# Patient Record
Sex: MALE | Race: BLACK OR AFRICAN AMERICAN | NOT HISPANIC OR LATINO | URBAN - METROPOLITAN AREA
[De-identification: names, ages, dates, MRNs, and addresses within clinical notes are randomized per-mention and may not be internally consistent; named-entity substitution may affect disease eponyms.]

---

## 2018-08-01 ENCOUNTER — EMERGENCY (EMERGENCY)
Facility: HOSPITAL | Age: 23
LOS: 1 days | Discharge: HOME | End: 2018-08-01
Attending: EMERGENCY MEDICINE | Admitting: EMERGENCY MEDICINE
Payer: COMMERCIAL

## 2018-08-01 VITALS
OXYGEN SATURATION: 98 % | SYSTOLIC BLOOD PRESSURE: 133 MMHG | WEIGHT: 173.28 LBS | DIASTOLIC BLOOD PRESSURE: 70 MMHG | RESPIRATION RATE: 18 BRPM | HEART RATE: 57 BPM | TEMPERATURE: 98 F

## 2018-08-01 DIAGNOSIS — Z87.891 PERSONAL HISTORY OF NICOTINE DEPENDENCE: ICD-10-CM

## 2018-08-01 DIAGNOSIS — R07.89 OTHER CHEST PAIN: ICD-10-CM

## 2018-08-01 DIAGNOSIS — R06.02 SHORTNESS OF BREATH: ICD-10-CM

## 2018-08-01 PROCEDURE — 99283 EMERGENCY DEPT VISIT LOW MDM: CPT

## 2018-08-01 NOTE — ED ADULT NURSE NOTE - OBJECTIVE STATEMENT
Patient c/o of 2/10 chest pain , states started 30min PTA to ED, w/ some mild SOB, no cough, no fevers.  Patient refusing EKG and to get into gown.  Patient states " how long will this take because someone is picking me up and where is the doctor because I just want a note to give to work to say I was here in the ED to be checked out."  Dr. Ortiz made aware.

## 2018-08-01 NOTE — ED ADULT TRIAGE NOTE - CHIEF COMPLAINT QUOTE
pt c/o chest pain and feeling SOB since 30 min PTA. pt able to speak clear, on the phone while being interview. refused EKG.  pt was explained the need for test, but still refusing ekg.

## 2018-08-01 NOTE — ED PROVIDER NOTE - MEDICAL DECISION MAKING DETAILS
transient chest pain after running.  unlikely acs, pe, pneumothorax.  discussed desire to do ecg but patient refusing.  states if symptoms recur/more persistent will return.  just wants note for work

## 2018-08-01 NOTE — ED PROVIDER NOTE - OBJECTIVE STATEMENT
here with episode of chest pain and shortness of breath.  Lasted about 10 seconds when running across the street.  Has felt it before and attributed to smoking cigarettes which he has since stopped.  Denies fever/chills, cough.  Now asymptomatic.  Says his boss brought him here and told him he needs to be seen but he doesn't want any tests done

## 2018-08-01 NOTE — ED ADULT NURSE NOTE - CHPI ED NUR SYMPTOMS NEG
no nausea/no weakness/no chills/no vomiting/no dizziness/no fever/no pain/no decreased eating/drinking/no tingling

## 2018-10-27 NOTE — ED ADULT NURSE NOTE - NSIMPLEMENTINTERV_GEN_ALL_ED
Supplemental Oxygen (see orders) Implemented All Universal Safety Interventions:  [place] to call system. Call bell, personal items and telephone within reach. Instruct patient to call for assistance. Room bathroom lighting operational. Non-slip footwear when patient is off stretcher. Physically safe environment: no spills, clutter or unnecessary equipment. Stretcher in lowest position, wheels locked, appropriate side rails in place.

## 2018-11-06 ENCOUNTER — INPATIENT (INPATIENT)
Facility: HOSPITAL | Age: 23
LOS: 2 days | Discharge: HOME | DRG: 897 | End: 2018-11-09
Attending: STUDENT IN AN ORGANIZED HEALTH CARE EDUCATION/TRAINING PROGRAM | Admitting: STUDENT IN AN ORGANIZED HEALTH CARE EDUCATION/TRAINING PROGRAM
Payer: COMMERCIAL

## 2018-11-06 VITALS
HEART RATE: 77 BPM | TEMPERATURE: 98 F | OXYGEN SATURATION: 100 % | SYSTOLIC BLOOD PRESSURE: 141 MMHG | WEIGHT: 169.98 LBS | DIASTOLIC BLOOD PRESSURE: 90 MMHG | RESPIRATION RATE: 18 BRPM
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
## 2018-11-06 DIAGNOSIS — F12.259 CANNABIS DEPENDENCE WITH PSYCHOTIC DISORDER, UNSPECIFIED: ICD-10-CM

## 2018-11-06 LAB
ANION GAP SERPL CALC-SCNC: 19 MMOL/L — HIGH (ref 5–17)
APAP SERPL-MCNC: <5 UG/ML — LOW (ref 10–30)
APPEARANCE UR: CLEAR — SIGNIFICANT CHANGE UP
BASOPHILS NFR BLD AUTO: 0.4 % — SIGNIFICANT CHANGE UP (ref 0–2)
BILIRUB UR-MCNC: ABNORMAL
BUN SERPL-MCNC: 22 MG/DL — SIGNIFICANT CHANGE UP (ref 7–23)
CALCIUM SERPL-MCNC: 9.9 MG/DL — SIGNIFICANT CHANGE UP (ref 8.4–10.5)
CHLORIDE SERPL-SCNC: 99 MMOL/L — SIGNIFICANT CHANGE UP (ref 96–108)
CO2 SERPL-SCNC: 22 MMOL/L — SIGNIFICANT CHANGE UP (ref 22–31)
COLOR SPEC: YELLOW — SIGNIFICANT CHANGE UP
CREAT SERPL-MCNC: 1.1 MG/DL — SIGNIFICANT CHANGE UP (ref 0.5–1.3)
DIFF PNL FLD: NEGATIVE — SIGNIFICANT CHANGE UP
EOSINOPHIL NFR BLD AUTO: 0.5 % — SIGNIFICANT CHANGE UP (ref 0–6)
ETHANOL SERPL-MCNC: <10 MG/DL — SIGNIFICANT CHANGE UP (ref 0–10)
GLUCOSE SERPL-MCNC: 93 MG/DL — SIGNIFICANT CHANGE UP (ref 70–99)
GLUCOSE UR QL: NEGATIVE — SIGNIFICANT CHANGE UP
HCT VFR BLD CALC: 40.8 % — SIGNIFICANT CHANGE UP (ref 39–50)
HGB BLD-MCNC: 14.2 G/DL — SIGNIFICANT CHANGE UP (ref 13–17)
KETONES UR-MCNC: >=80 MG/DL
LEUKOCYTE ESTERASE UR-ACNC: NEGATIVE — SIGNIFICANT CHANGE UP
LYMPHOCYTES # BLD AUTO: 21.1 % — SIGNIFICANT CHANGE UP (ref 13–44)
MCHC RBC-ENTMCNC: 29.6 PG — SIGNIFICANT CHANGE UP (ref 27–34)
MCHC RBC-ENTMCNC: 34.8 G/DL — SIGNIFICANT CHANGE UP (ref 32–36)
MCV RBC AUTO: 85.2 FL — SIGNIFICANT CHANGE UP (ref 80–100)
MONOCYTES NFR BLD AUTO: 10.9 % — SIGNIFICANT CHANGE UP (ref 2–14)
NEUTROPHILS NFR BLD AUTO: 67.1 % — SIGNIFICANT CHANGE UP (ref 43–77)
NITRITE UR-MCNC: NEGATIVE — SIGNIFICANT CHANGE UP
PCP SPEC-MCNC: SIGNIFICANT CHANGE UP
PH UR: 6 — SIGNIFICANT CHANGE UP (ref 5–8)
PLATELET # BLD AUTO: 174 K/UL — SIGNIFICANT CHANGE UP (ref 150–400)
POTASSIUM SERPL-MCNC: 3.7 MMOL/L — SIGNIFICANT CHANGE UP (ref 3.5–5.3)
POTASSIUM SERPL-SCNC: 3.7 MMOL/L — SIGNIFICANT CHANGE UP (ref 3.5–5.3)
PROT UR-MCNC: 30 MG/DL
RBC # BLD: 4.79 M/UL — SIGNIFICANT CHANGE UP (ref 4.2–5.8)
RBC # FLD: 12.8 % — SIGNIFICANT CHANGE UP (ref 10.3–16.9)
SALICYLATES SERPL-MCNC: <0.3 MG/DL — LOW (ref 2.8–20)
SODIUM SERPL-SCNC: 140 MMOL/L — SIGNIFICANT CHANGE UP (ref 135–145)
SP GR SPEC: >=1.03 — SIGNIFICANT CHANGE UP (ref 1–1.03)
UROBILINOGEN FLD QL: 1 E.U./DL — SIGNIFICANT CHANGE UP
WBC # BLD: 9.6 K/UL — SIGNIFICANT CHANGE UP (ref 3.8–10.5)
WBC # FLD AUTO: 9.6 K/UL — SIGNIFICANT CHANGE UP (ref 3.8–10.5)

## 2018-11-06 PROCEDURE — 90792 PSYCH DIAG EVAL W/MED SRVCS: CPT

## 2018-11-06 PROCEDURE — 99285 EMERGENCY DEPT VISIT HI MDM: CPT | Mod: 25

## 2018-11-06 PROCEDURE — 93010 ELECTROCARDIOGRAM REPORT: CPT

## 2018-11-06 RX ORDER — RISPERIDONE 4 MG/1
1 TABLET ORAL EVERY 12 HOURS
Qty: 0 | Refills: 0 | Status: DISCONTINUED | OUTPATIENT
Start: 2018-11-06 | End: 2018-11-08

## 2018-11-06 RX ORDER — DIPHENHYDRAMINE HCL 50 MG
50 CAPSULE ORAL ONCE
Qty: 0 | Refills: 0 | Status: COMPLETED | OUTPATIENT
Start: 2018-11-06 | End: 2018-11-06

## 2018-11-06 RX ADMIN — Medication 50 MILLIGRAM(S): at 22:05

## 2018-11-06 NOTE — ED BEHAVIORAL HEALTH ASSESSMENT NOTE - DESCRIPTION
denies Patient was born in [place]. He has a brother and a sister. He is single. He used to work as a  patient presents calm and cooperative

## 2018-11-06 NOTE — ED PROVIDER NOTE - MEDICAL DECISION MAKING DETAILS
Patient in ED with concern for auditory hallucinations.  Patient without SI/HI.  Non toxic, labs and urine showing sign of dehydration.  Patient tolerating oral hydration without difficulty.  Psych in ED to eval patient and recs are made for admission at this time.  Psych aware of dehydration, however no need to medically admit for IV hydration as patient tolerating oral without difficulty.  Patient medically cleared for psychiatric admission.
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
## 2018-11-06 NOTE — ED PROVIDER NOTE - OBJECTIVE STATEMENT
23 year old male with history of psychiatric disease presents to ED with concern for auditory hallucinations over the past several months.  Patient states he was on risperdol but had been taken off of this medication "a while ago."  Patient states the dosage was too high and it was making him "zombie-noelle."  He notes multiple prior psychiatric hospitalizations and feels he may need hospitalization to "get back on track."  He denies associated fever, chills, chest pain, shortness of breath, abdominal pain, nausea, vomiting or any additional acute complaints or concerns at this time.

## 2018-11-06 NOTE — ED ADULT NURSE NOTE - OBJECTIVE STATEMENT
Patient presents to the ED "I hear people talking to me, but when I look there is no one there."  Patient states he is diagnosed bipolar but not taking medications.  denies SERGIO BROWNE.  1:1 at bedside. Patient presents to the ED "I hear people talking to me, but when I look there is no one there."  Patient states he is diagnosed bipolar but not taking medications.  denies SI, HI.   Patient cooperative at this time, speaking fast and loudly.   1:1 at bedside.

## 2018-11-06 NOTE — ED BEHAVIORAL HEALTH ASSESSMENT NOTE - SUMMARY
22 y/o man AA, unemployed, homeless, single with no PMH and self reported history of bipolar disorder, K2 use presenting to the ED reporting worsening auditory hallucinations for the last 2 days in context of ongoing use of K2 and nonadherence with his treatment with risperidone 2mg.  Patient is currently requesting a voluntary admission,  to be restarted on Risperidone and to be reconnected with outpatient treatment.

## 2018-11-06 NOTE — ED BEHAVIORAL HEALTH ASSESSMENT NOTE - OTHER PAST PSYCHIATRIC HISTORY (INCLUDE DETAILS REGARDING ONSET, COURSE OF ILLNESS, INPATIENT/OUTPATIENT TREATMENT)
Patient reports multiple past psychiatric admission. His last admission was 2-3 years ago at [facility]

## 2018-11-06 NOTE — ED ADULT NURSE NOTE - NSIMPLEMENTINTERV_GEN_ALL_ED
Implemented All Universal Safety Interventions:  [place] to call system. Call bell, personal items and telephone within reach. Instruct patient to call for assistance. Room bathroom lighting operational. Non-slip footwear when patient is off stretcher. Physically safe environment: no spills, clutter or unnecessary equipment. Stretcher in lowest position, wheels locked, appropriate side rails in place.

## 2018-11-06 NOTE — ED BEHAVIORAL HEALTH ASSESSMENT NOTE - RISK ASSESSMENT
Patient presents calm and cooperative. He denies any SI/HI. Due to his recent use of K2 he is at risk of having impulsive behavior.

## 2018-11-06 NOTE — ED ADULT TRIAGE NOTE - CHIEF COMPLAINT QUOTE
Patient states he is hearing voices and waking up in places he doesn't know.  Patient states he was on Risperdal, but recently stopped because he felt it wasn't necessary. Patient admits to K2 use last night.  Patient denies any VH, SI/HI or any other complaints.  Patient cooperative, seems anxious and paranoid.

## 2018-11-07 DIAGNOSIS — F31.9 BIPOLAR DISORDER, UNSPECIFIED: ICD-10-CM

## 2018-11-07 DIAGNOSIS — F31.63 BIPOLAR DISORDER, CURRENT EPISODE MIXED, SEVERE, WITHOUT PSYCHOTIC FEATURES: ICD-10-CM

## 2018-11-07 DIAGNOSIS — F19.90 OTHER PSYCHOACTIVE SUBSTANCE USE, UNSPECIFIED, UNCOMPLICATED: ICD-10-CM

## 2018-11-07 PROCEDURE — 99232 SBSQ HOSP IP/OBS MODERATE 35: CPT

## 2018-11-07 RX ORDER — DIPHENHYDRAMINE HCL 50 MG
50 CAPSULE ORAL ONCE
Qty: 0 | Refills: 0 | Status: COMPLETED | OUTPATIENT
Start: 2018-11-07 | End: 2018-11-08

## 2018-11-07 NOTE — BEHAVIORAL HEALTH ASSESSMENT NOTE - NS ED BHA MSE SPEECH SPONTANEITY
Note Text (......Xxx Chief Complaint.): This diagnosis correlates with the
Other (Free Text): Soaks handout given to pt
Detail Level: Simple
Normal

## 2018-11-07 NOTE — BEHAVIORAL HEALTH ASSESSMENT NOTE - NSBHCHARTREVIEWVS_PSY_A_CORE FT
Vital Signs Last 24 Hrs  T(C): 36.8 (07 Nov 2018 09:30), Max: 37.2 (06 Nov 2018 18:48)  T(F): 98.3 (07 Nov 2018 09:30), Max: 99 (06 Nov 2018 18:48)  HR: 81 (07 Nov 2018 09:30) (61 - 84)  BP: 152/86 (07 Nov 2018 09:30) (132/79 - 152/86)  BP(mean): --  RR: 18 (07 Nov 2018 09:30) (18 - 18)  SpO2: 100% (06 Nov 2018 17:04) (100% - 100%)

## 2018-11-07 NOTE — BEHAVIORAL HEALTH ASSESSMENT NOTE - RISK ASSESSMENT
static: mood disorder ; substance abuse with potential for psychosis (K-2)  modifiable: stabilize mood; "wash out period" for K-2; treat psychotic features; addressed in treatment plan  protective: appears to want to live; does not wish to die.
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
## 2018-11-08 PROCEDURE — 99222 1ST HOSP IP/OBS MODERATE 55: CPT | Mod: GC

## 2018-11-08 PROCEDURE — 99232 SBSQ HOSP IP/OBS MODERATE 35: CPT

## 2018-11-08 RX ORDER — DIPHENHYDRAMINE HCL 50 MG
50 CAPSULE ORAL ONCE
Qty: 0 | Refills: 0 | Status: DISCONTINUED | OUTPATIENT
Start: 2018-11-08 | End: 2018-11-09

## 2018-11-08 RX ORDER — HALOPERIDOL DECANOATE 100 MG/ML
5 INJECTION INTRAMUSCULAR ONCE
Qty: 0 | Refills: 0 | Status: DISCONTINUED | OUTPATIENT
Start: 2018-11-08 | End: 2018-11-09

## 2018-11-08 RX ORDER — RISPERIDONE 4 MG/1
1.5 TABLET ORAL EVERY 12 HOURS
Qty: 0 | Refills: 0 | Status: DISCONTINUED | OUTPATIENT
Start: 2018-11-08 | End: 2018-11-09

## 2018-11-08 RX ORDER — TETANUS AND DIPHTHERIA TOXOIDS ADSORBED 2; 2 [LF]/.5ML; [LF]/.5ML
0.5 INJECTION INTRAMUSCULAR ONCE
Qty: 0 | Refills: 0 | Status: COMPLETED | OUTPATIENT
Start: 2018-11-08 | End: 2018-11-08

## 2018-11-08 RX ORDER — DIPHENHYDRAMINE HCL 50 MG
50 CAPSULE ORAL ONCE
Qty: 0 | Refills: 0 | Status: COMPLETED | OUTPATIENT
Start: 2018-11-08 | End: 2018-11-08

## 2018-11-08 RX ORDER — DIPHENHYDRAMINE HCL 50 MG
50 CAPSULE ORAL
Qty: 0 | Refills: 0 | Status: DISCONTINUED | OUTPATIENT
Start: 2018-11-08 | End: 2018-11-08

## 2018-11-08 RX ORDER — BACITRACIN ZINC 500 UNIT/G
1 OINTMENT IN PACKET (EA) TOPICAL EVERY 6 HOURS
Qty: 0 | Refills: 0 | Status: DISCONTINUED | OUTPATIENT
Start: 2018-11-08 | End: 2018-11-09

## 2018-11-08 RX ORDER — DIPHENHYDRAMINE HCL 50 MG
50 CAPSULE ORAL
Qty: 0 | Refills: 0 | Status: COMPLETED | OUTPATIENT
Start: 2018-11-08 | End: 2018-11-09

## 2018-11-08 RX ORDER — HALOPERIDOL DECANOATE 100 MG/ML
5 INJECTION INTRAMUSCULAR ONCE
Qty: 0 | Refills: 0 | Status: COMPLETED | OUTPATIENT
Start: 2018-11-08 | End: 2018-11-08

## 2018-11-08 RX ADMIN — Medication 50 MILLIGRAM(S): at 23:40

## 2018-11-08 RX ADMIN — HALOPERIDOL DECANOATE 5 MILLIGRAM(S): 100 INJECTION INTRAMUSCULAR at 05:24

## 2018-11-08 RX ADMIN — Medication 1 APPLICATION(S): at 22:03

## 2018-11-08 RX ADMIN — Medication 50 MILLIGRAM(S): at 05:24

## 2018-11-08 RX ADMIN — Medication 2 MILLIGRAM(S): at 05:24

## 2018-11-08 RX ADMIN — TETANUS AND DIPHTHERIA TOXOIDS ADSORBED 0.5 MILLILITER(S): 2; 2 INJECTION INTRAMUSCULAR at 16:51

## 2018-11-08 RX ADMIN — Medication 50 MILLIGRAM(S): at 00:13

## 2018-11-08 RX ADMIN — RISPERIDONE 1 MILLIGRAM(S): 4 TABLET ORAL at 19:10

## 2018-11-08 RX ADMIN — Medication 1 APPLICATION(S): at 19:10
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
## 2018-11-08 NOTE — PROGRESS NOTE BEHAVIORAL HEALTH - NSBHFUPINTERVALCCFT_PSY_A_CORE
Patient had little to say from the quiet room; sleep appetite ok.  While mentioned pain in legs did not want to show legs to writer (and RN present during the interview) nor allow Xrays at this time.  No explanation of aggressive and destructive behavior of last night.  Mostly stays under the sheet ; avoidant.

## 2018-11-08 NOTE — PROGRESS NOTE BEHAVIORAL HEALTH - NSBHCHARTREVIEWVS_PSY_A_CORE FT
Vital Signs Last 24 Hrs  T(C): 36.8 (07 Nov 2018 17:00), Max: 36.8 (07 Nov 2018 17:00)  T(F): 98.3 (07 Nov 2018 17:00), Max: 98.3 (07 Nov 2018 17:00)  HR: 88 (07 Nov 2018 17:00) (88 - 88)  BP: 157/89 (07 Nov 2018 17:00) (157/89 - 157/89)  BP(mean): --  RR: 18 (07 Nov 2018 17:00) (18 - 18)  SpO2: --

## 2018-11-09 VITALS
HEART RATE: 81 BPM | RESPIRATION RATE: 20 BRPM | SYSTOLIC BLOOD PRESSURE: 145 MMHG | DIASTOLIC BLOOD PRESSURE: 81 MMHG | TEMPERATURE: 98 F

## 2018-11-09 PROCEDURE — 99238 HOSP IP/OBS DSCHRG MGMT 30/<: CPT

## 2018-11-09 RX ORDER — RISPERIDONE 4 MG/1
1 TABLET ORAL
Qty: 30 | Refills: 0 | OUTPATIENT
Start: 2018-11-09 | End: 2018-11-23

## 2018-11-09 RX ORDER — BACITRACIN ZINC 500 UNIT/G
1 OINTMENT IN PACKET (EA) TOPICAL
Qty: 1 | Refills: 0 | OUTPATIENT
Start: 2018-11-09 | End: 2018-11-15

## 2018-11-09 RX ADMIN — Medication 1 APPLICATION(S): at 07:38

## 2018-11-09 RX ADMIN — Medication 50 MILLIGRAM(S): at 01:30
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
## 2018-11-09 NOTE — PROGRESS NOTE BEHAVIORAL HEALTH - PROBLEM SELECTOR PLAN 2
Risperdal ; see interval and summary data for updates;  counselling;
Risperdal ; see interval and summary data for updates;  counselling;

## 2018-11-09 NOTE — PROGRESS NOTE BEHAVIORAL HEALTH - NSBHCHARTREVIEWVS_PSY_A_CORE FT
Vital Signs Last 24 Hrs  T(C): 36.7 (09 Nov 2018 08:48), Max: 36.9 (08 Nov 2018 20:39)  T(F): 98.1 (09 Nov 2018 08:48), Max: 98.5 (08 Nov 2018 20:39)  HR: 81 (09 Nov 2018 08:48) (73 - 81)  BP: 145/81 (09 Nov 2018 08:48) (145/81 - 151/82)  BP(mean): --  RR: 20 (09 Nov 2018 08:48) (18 - 20)  SpO2: --

## 2018-11-09 NOTE — PROGRESS NOTE BEHAVIORAL HEALTH - PROBLEM SELECTOR PLAN 1
Risperdal ; see interval and summary data for updates
Risperdal ; see interval and summary data for updates
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
## 2018-11-09 NOTE — PROGRESS NOTE BEHAVIORAL HEALTH - RISK ASSESSMENT
static: mood disorder ; substance abuse with potential for psychosis (K-2)  modifiable: stabilize mood; "wash out period" for K-2; treat psychotic features; addressed in treatment plan  protective: appears to want to live; does not wish to die.
static: mood disorder ; substance abuse with potential for psychosis (K-2)  modifiable: stabilize mood; "wash out period" for K-2; treat psychotic features; addressed in treatment plan  protective: appears to want to live; does not wish to die.

## 2018-11-09 NOTE — PROGRESS NOTE BEHAVIORAL HEALTH - NSBHFUPINTERVALHXFT_PSY_A_CORE
MSE: dressed and sitting at family meeting  . .  good eye contact.  clean; alert, oriented, cognition intact.  denies AVH or s/h i/i/p.  speech normal volume, spontaneous, clear;  demonstrates normal gait;  no tremor or rigidity; affect generally constricted.  no strangeness of language use;  focused on leaving ; mood slightly sad but not constricted;  thought congruent.    i&j: fair to poor; names some medications.  accepts verbal therapy.  appears to accept treatments and need to treat sxs. MSE:  attends some  groups; fair eye contact; clean.    at the nursing station most of the time.   cognition is intact.  would not cooperate with interview; speech normal volume, spontaneous, clear; no evidence of tremor or rigidity; smiles inappropriately; mildly anxious mood and affect; tc/tp no peculiarities of language use.  rather evasive and somewhat guarded.  i&j: poor; accepts treatment; however not reflective on sxs or situation; accepts verbal therapy.

## 2018-11-14 DIAGNOSIS — S61.411A LACERATION WITHOUT FOREIGN BODY OF RIGHT HAND, INITIAL ENCOUNTER: ICD-10-CM

## 2018-11-14 DIAGNOSIS — S61.512A LACERATION WITHOUT FOREIGN BODY OF LEFT WRIST, INITIAL ENCOUNTER: ICD-10-CM
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
## 2018-11-14 DIAGNOSIS — Z59.0 HOMELESSNESS: ICD-10-CM

## 2018-11-14 DIAGNOSIS — F31.9 BIPOLAR DISORDER, UNSPECIFIED: ICD-10-CM

## 2018-11-14 DIAGNOSIS — F19.14 OTHER PSYCHOACTIVE SUBSTANCE ABUSE WITH PSYCHOACTIVE SUBSTANCE-INDUCED MOOD DISORDER: ICD-10-CM
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
## 2018-11-14 DIAGNOSIS — Z91.14 PATIENT'S OTHER NONCOMPLIANCE WITH MEDICATION REGIMEN: ICD-10-CM

## 2018-11-14 DIAGNOSIS — F16.94 HALLUCINOGEN USE, UNSPECIFIED WITH HALLUCINOGEN-INDUCED MOOD DISORDER: ICD-10-CM

## 2018-11-14 SDOH — ECONOMIC STABILITY - HOUSING INSECURITY: HOMELESSNESS: Z59.0

## 2019-03-16 NOTE — BEHAVIORAL HEALTH ASSESSMENT NOTE - MEDICAL RECORD REVIEWED
Yes [Follow-Up: _____] : a [unfilled] follow-up visit [FreeTextEntry1] : type 2 DM, hypothyroidism and hyperlipidemia

## 2019-07-10 PROCEDURE — 80307 DRUG TEST PRSMV CHEM ANLYZR: CPT

## 2019-07-10 PROCEDURE — 80061 LIPID PANEL: CPT

## 2019-07-10 PROCEDURE — 93005 ELECTROCARDIOGRAM TRACING: CPT

## 2019-07-10 PROCEDURE — 85025 COMPLETE CBC W/AUTO DIFF WBC: CPT

## 2019-07-10 PROCEDURE — 80048 BASIC METABOLIC PNL TOTAL CA: CPT

## 2019-07-10 PROCEDURE — 83036 HEMOGLOBIN GLYCOSYLATED A1C: CPT

## 2019-07-10 PROCEDURE — 99285 EMERGENCY DEPT VISIT HI MDM: CPT | Mod: 25

## 2019-07-10 PROCEDURE — 81001 URINALYSIS AUTO W/SCOPE: CPT

## 2019-07-10 PROCEDURE — 36415 COLL VENOUS BLD VENIPUNCTURE: CPT

## 2019-07-10 PROCEDURE — 90714 TD VACC NO PRESV 7 YRS+ IM: CPT

## 2021-06-29 NOTE — ED PROVIDER NOTE - PSYCHIATRIC RISK
SUICIDE/SELF-INJURIOUS BEHAVIOR
no verbalization of thoughts of harm

## 2023-05-31 NOTE — ED ADULT NURSE NOTE - CAS EDN INTEG ASSESS
WDL Klisyri Counseling:  I discussed with the patient the risks of Klisyri including but not limited to erythema, scaling, itching, weeping, crusting, and pain.

## 2024-09-10 NOTE — ED ADULT TRIAGE NOTE - TEMPERATURE IN CELSIUS (DEGREES C)
Pt arrived ambulatory to [facility] for weekly H/H possible Retacrit injection. POC dicussed with pt and she agrees with plan.     Labs drawn as ordered. Results reviewed, Hgb 9.9 today. Pt meets parameters for Retacrit. Pt medicated per MAR. Retacrit given right back arm. Pt tolerated treatment without s/s adverse reaction.     Pt discharged to [facility] care, [facility]. Pt's next appointment confirmed 9/17/2024.   36.8